# Patient Record
Sex: MALE | Race: WHITE
[De-identification: names, ages, dates, MRNs, and addresses within clinical notes are randomized per-mention and may not be internally consistent; named-entity substitution may affect disease eponyms.]

---

## 2018-11-12 ENCOUNTER — HOSPITAL ENCOUNTER (EMERGENCY)
Dept: HOSPITAL 47 - EC | Age: 28
Discharge: HOME | End: 2018-11-12
Payer: COMMERCIAL

## 2018-11-12 VITALS — RESPIRATION RATE: 18 BRPM

## 2018-11-12 VITALS — DIASTOLIC BLOOD PRESSURE: 76 MMHG | SYSTOLIC BLOOD PRESSURE: 133 MMHG | TEMPERATURE: 97.1 F | HEART RATE: 60 BPM

## 2018-11-12 DIAGNOSIS — J39.2: ICD-10-CM

## 2018-11-12 DIAGNOSIS — G44.82: Primary | ICD-10-CM

## 2018-11-12 DIAGNOSIS — F17.200: ICD-10-CM

## 2018-11-12 PROCEDURE — 87502 INFLUENZA DNA AMP PROBE: CPT

## 2018-11-12 PROCEDURE — 87430 STREP A AG IA: CPT

## 2018-11-12 PROCEDURE — 99284 EMERGENCY DEPT VISIT MOD MDM: CPT

## 2018-11-12 PROCEDURE — 87081 CULTURE SCREEN ONLY: CPT

## 2018-11-12 PROCEDURE — 70450 CT HEAD/BRAIN W/O DYE: CPT

## 2018-11-12 NOTE — CT
EXAMINATION TYPE: CT brain wo con

 

DATE OF EXAM: 11/12/2018

 

COMPARISON: None

 

INDICATION: Right posterior headache during sex.

 

DLP: 1209.4 mGycm, Automated exposure control for dose reduction was used.

 

CONTRAST: None

 

CT of the brain is performed utilizing 3 mm thick sections through the posterior fossa and 3 mm thick
 sections through the remaining calvarium.  Study is performed within 24 hours of arrival to the hosp
ital. 

 

No abnormal hyperdensity is present to suggest an acute intracranial hemorrhage.

No mass lesion is evident.

No acute infarcts are evident.

Ventricles and sulci are appropriate for the patient age.  

Paranasal sinuses and mastoid air cells within the field-of-view are clear.

 

IMPRESSIONS:

1.   Normal CT Brain

## 2018-11-20 ENCOUNTER — HOSPITAL ENCOUNTER (OUTPATIENT)
Dept: HOSPITAL 47 - RADNMMAIN | Age: 28
Discharge: HOME | End: 2018-11-20
Attending: FAMILY MEDICINE
Payer: COMMERCIAL

## 2018-11-20 DIAGNOSIS — R55: Primary | ICD-10-CM

## 2018-11-20 PROCEDURE — 93017 CV STRESS TEST TRACING ONLY: CPT

## 2018-11-21 NOTE — EST
EXERCISE STRESS



DATE OF SERVICE:

11/20/2018



AGE:

27



SEX:

Male



HT:

72



WT:

215



PROTOCOL:

Myron



STAGE:

2



DURATION OF EXERCISE:

11:00



HEART RATE REST:

84



BLOOD PRESSURE REST:

128/61



MAXIMUM HEART RATE ACHIEVED:

161



MAXIMUM BLOOD PRESSURE:

189/79



85% MPHR:

162



100% MPHR:

191



METS:

12.1



INDICATIONS:

Syncope.



Chest pain.



CLINICAL INFORMATION:

STRESS DATA:  Pre-testing physical examination showed a heart rate of 84, pressure

122/61 mmHg. Baseline EKG showed sinus mechanism.  The patient exercised on the

treadmill according to Myron protocol for a total of 11 minutes and achieved 12.1 METS.

The maximum heart rate was 161 beats per minute, which is about 84% of maximum

predicted heart rate.  Maximum blood pressure was 189/79 mmHg. Clinically the patient

did not have any symptoms of chest pain or chest discomfort during the testing or on

recovery.  The EKG did not show any significant ST or T-wave abnormalities concerning

for ischemia.



CONCLUSION:

1. Excellent exercise tolerance.

2. Normal EKG in response to exercise.

3. Essentially normal stress test for the patient.





MMODL / IJN: [id number] / Job#: [id number]

## 2020-06-12 ENCOUNTER — HOSPITAL ENCOUNTER (OUTPATIENT)
Dept: HOSPITAL 47 - LABWHC1 | Age: 30
Discharge: HOME | End: 2020-06-12
Attending: FAMILY MEDICINE
Payer: COMMERCIAL

## 2020-06-12 DIAGNOSIS — Z11.59: Primary | ICD-10-CM
